# Patient Record
(demographics unavailable — no encounter records)

---

## 2024-10-31 NOTE — HISTORY OF PRESENT ILLNESS
[FreeTextEntry8] : The patient presents today for c/o of cough since last Thursday. Reports fevers for the last week. States she is having coughing attacks that can make her throw up. She saw her father family medicine physician on Monday -- states she had blood work done which she reports was normal and states a rapid flu shot was negative. Denies sick contacts. Associated with sore throat. Reports at home test for covid was negative.   Has been using a cough medication which was prescribed by the physician. Otherwise is a taking Motrin.

## 2024-10-31 NOTE — PLAN
[FreeTextEntry1] : 1. URI - c/o of cough since last Thursday. Reports fevers for the last week. States she is having coughing attacks that can make her throw up. She saw her father family medicine physician on Monday -- states she had blood work done which she reports was normal and states a rapid flu shot was negative. Denies sick contacts. Reports at home test for covid was negative.  - afebrile today - rapid strep negative - trial of benzonatate and albuterol HFA PRN - Augmentin if symptoms persist into this weekend - c/w supportive care, rest, hydration, honey, warm teas  - RTC if no improvement with above

## 2024-10-31 NOTE — PHYSICAL EXAM
[No Acute Distress] : no acute distress [Well-Appearing] : well-appearing [Normal Sclera/Conjunctiva] : normal sclera/conjunctiva [EOMI] : extraocular movements intact [Normal Outer Ear/Nose] : the outer ears and nose were normal in appearance [Normal Oropharynx] : the oropharynx was normal [Normal TMs] : both tympanic membranes were normal [Normal Nasal Mucosa] : the nasal mucosa was normal [No Lymphadenopathy] : no lymphadenopathy [Supple] : supple [No Respiratory Distress] : no respiratory distress  [No Accessory Muscle Use] : no accessory muscle use [Clear to Auscultation] : lungs were clear to auscultation bilaterally [Normal Rate] : normal rate  [Regular Rhythm] : with a regular rhythm [Normal S1, S2] : normal S1 and S2 [No Edema] : there was no peripheral edema [Soft] : abdomen soft [Non Tender] : non-tender [Non-distended] : non-distended [Normal Bowel Sounds] : normal bowel sounds [Normal Posterior Cervical Nodes] : no posterior cervical lymphadenopathy [Normal Anterior Cervical Nodes] : no anterior cervical lymphadenopathy [No CVA Tenderness] : no CVA  tenderness [No Spinal Tenderness] : no spinal tenderness [No Joint Swelling] : no joint swelling [Grossly Normal Strength/Tone] : grossly normal strength/tone [Coordination Grossly Intact] : coordination grossly intact [Normal Gait] : normal gait [Normal Affect] : the affect was normal [Normal Insight/Judgement] : insight and judgment were intact

## 2024-12-31 NOTE — HISTORY OF PRESENT ILLNESS
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 Pt states that the Amitriptyline has been providing significant relief, but she still has occasional constipation. Bloodwork and stool tests performed on 5/13/2024 were unremarkable. Last EGD-Colonoscopy was 6/3/2024. EGD showed gastritis and was negative for H. Pylori. Colonoscopy was unremarkable except for Grade I Internal Hemorrhoids.

## 2024-12-31 NOTE — REASON FOR VISIT
[Home] : at home, [unfilled] , at the time of the visit. [Medical Office: ([facility])___] : at the medical office located in  [Patient] : the patient [Self] : self [Follow-up] : a follow-up of an existing diagnosis [FreeTextEntry1] : IBS-C, Chronic Constipation

## 2024-12-31 NOTE — ASSESSMENT
[FreeTextEntry1] : Instructed pt to stop taking Amitriptyline 10 mg. Amitriptyline 25 mg prescribed, instructed pt to take once daily at night. Instructed pt to continue taking Omeprazole. Instructed pt to f/u by phone in 10 weeks. Pt expressed understanding and agrees with the plan.  A low acid/reflux diet was discussed in great detail including not smoking, not drinking alcohol, and not consuming foods that irritate the esophagus. It is helpful to eat small meals throughout the day instead of large meals. You should avoid eating before bedtime or lying down after you eat. It can be helpful to raise the head of your bed six inches. Additionally, you should maintain a healthy weight and good posture. The patient was given written material to take home and review.  The symptoms of IBS-C include abdominal pain and discomfort, along with changes in bowel function. Bloating and/or gas also may happen. Changes in bowel function may include straining, infrequent stools, hard or lumpy tools, and/or a feeling that the bowel does not empty completely. Some people may feel as if there is a "blockage" preventing them from passing stools. They may need to press on a part of their body or change body position to help them complete their bowel movement. How often a person passes stool, or the way it appears, may be different when abdominal discomfort is happening. With IBS-C, abdominal discomfort often improves after a bowel movement. In most cases, symptoms are ongoing (chronic), but they may come and go. The cause of IBS-C is not known. Some experts think that it relates to changes in how the intestines move and contract, or changes in how the gut senses pain. In some patients, IBS-C may happen after a past infection in the gut. It could also be related to changes in the messages between the brain and the intestines. There is evidence that bacteria which are normally found in the gut, or changes to the composition of those bacteria, play a role. In addition, researchers are looking into possible roles of genetics and/or changes in the immune system. We discussed this at length.  The causes of constipation were discussed at length. We discussed: Eat three meals each day. Do not skip meals. Gradually increase the amount of FIBER in your diet. Choose more whole grain breads, cereals, and rice. Select more raw fruits and vegetables and eat the peel, if appropriate. Read food labels and look for the "dietary fiber" content of foods. Good sources have 2 grams of fiber or more. Drink six to eight glasses of water each day. Limit highly refined and processed foods.  I spent 35 minutes reviewing the patient's records prior to arrival, with the patient, and reviewing records after the visit. All prior testing reviewed at length. Patient verbalized understanding of all information provided. All questions answered and reviewed.  Robert Brunner, MD

## 2025-04-23 NOTE — PHYSICAL EXAM

## 2025-04-23 NOTE — HISTORY OF PRESENT ILLNESS
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 Pt states that the Amitriptyline has been providing significant relief, but for her chronic constipation. Bloodwork and stool tests performed on 5/13/2024 were unremarkable. Pt states that she will soon be going back to school at the "[employer] in [place], [place] and will be returning in 8/2025. Last EGD-Colonoscopy was 6/3/2024. EGD showed gastritis and was negative for H. Pylori. Colonoscopy was unremarkable except for Grade I Internal Hemorrhoids.

## 2025-04-23 NOTE — ASSESSMENT
[FreeTextEntry1] : Instructed pt to take her Omeprazole 40 mg BID as prescribed, refills sent. Instructed pt to continue all other present management. Instructed pt to f/u by phone in 1 month or if she develops any sx before 8/2025. Pt expressed understanding and agrees with the plan.  A low acid/reflux diet was discussed in great detail including not smoking, not drinking alcohol, and not consuming foods that irritate the esophagus. It is helpful to eat small meals throughout the day instead of large meals. You should avoid eating before bedtime or lying down after you eat. It can be helpful to raise the head of your bed six inches. Additionally, you should maintain a healthy weight and good posture. The patient was given written material to take home and review.  The symptoms of IBS-C include abdominal pain and discomfort, along with changes in bowel function. Bloating and/or gas also may happen. Changes in bowel function may include straining, infrequent stools, hard or lumpy tools, and/or a feeling that the bowel does not empty completely. Some people may feel as if there is a "blockage" preventing them from passing stools. They may need to press on a part of their body or change body position to help them complete their bowel movement. How often a person passes stool, or the way it appears, may be different when abdominal discomfort is happening. With IBS-C, abdominal discomfort often improves after a bowel movement. In most cases, symptoms are ongoing (chronic), but they may come and go. The cause of IBS-C is not known. Some experts think that it relates to changes in how the intestines move and contract, or changes in how the gut senses pain. In some patients, IBS-C may happen after a past infection in the gut. It could also be related to changes in the messages between the brain and the intestines. There is evidence that bacteria which are normally found in the gut, or changes to the composition of those bacteria, play a role. In addition, researchers are looking into possible roles of genetics and/or changes in the immune system. We discussed this at length.  The causes of constipation were discussed at length. We discussed: Eat three meals each day. Do not skip meals. Gradually increase the amount of FIBER in your diet. Choose more whole grain breads, cereals, and rice. Select more raw fruits and vegetables and eat the peel, if appropriate. Read food labels and look for the "dietary fiber" content of foods. Good sources have 2 grams of fiber or more. Drink six to eight glasses of water each day. Limit highly refined and processed foods.  I spent 35 minutes reviewing the patient's records prior to arrival, with the patient, and reviewing records after the visit. All prior testing reviewed at length. Patient verbalized understanding of all information provided. All questions answered and reviewed.  Robert Brunner, MD

## 2025-06-23 NOTE — PHYSICAL EXAM
[Alert] : alert [Normal Voice/Communication] : normal voice/communication [Healthy Appearing] : healthy appearing [No Acute Distress] : no acute distress [Sclera] : the sclera and conjunctiva were normal [Hearing Threshold Finger Rub Not [place]] : hearing was normal [Normal Lips/Gums] : the lips and gums were normal [Oropharynx] : the oropharynx was normal [Normal Appearance] : the appearance of the neck was normal [No Neck Mass] : no neck mass was observed [No Respiratory Distress] : no respiratory distress [No Acc Muscle Use] : no accessory muscle use [Respiration, Rhythm And Depth] : normal respiratory rhythm and effort [Auscultation Breath Sounds / Voice Sounds] : lungs were clear to auscultation bilaterally [Heart Rate And Rhythm] : heart rate was normal and rhythm regular [Normal S1, S2] : normal S1 and S2 [Murmurs] : no murmurs [Bowel Sounds] : normal bowel sounds [No Masses] : no abdominal mass palpated [Abdomen Soft] : soft [] : no hepatosplenomegaly [Other: ___] : [unfilled] [Oriented To Time, Place, And Person] : oriented to person, place, and time

## 2025-06-23 NOTE — ASSESSMENT
[FreeTextEntry1] : The patient mentioned feeling BLOATED   A low FODMAP diet was discussed with the patient at length. The patient had multiple questions all of which were answered. I recommended a nutritionist. Also recommended that the patient keep a food diary. We discussed  options such as Vegetables. Fresh fruits. Dairy that is lactose-free, and hard cheeses, or ripened/matured cheeses including... Beef, pork, chicken, fish, eggs. Avoid breadcrumbs, marinades, and sauces/gravies that may be high in FODMAPs. Soy products including tofu, tempeh. Grains.  The Patient mentioned being GASEOUS  We discussed Probiotics and limiting leafy vegetables and other changes The patient mentioned some GERD We discussed a low acid , high protien diet . The patient said their is occasional CONSTIPATION  We discussed the proper use of fiber and water intake   Patient will need IBS panel including blood work and stool cultures instructions provided how to obtain sample and where to return specimen.  Abdominal sonogram The risks benefits alternatives and complications of the procedure/s were explained to the patient at length. The patient was agreeable and we will proceed.  Patient advised if s/s worsen or become severe to seek emergency treatment   Patient verbalized understanding of all information provided. All questions answered and reviewed.  I spent 35 minutes with the patient as well as reviewing documents prior to and after the office visit   Referral for PCP Dr Hernandez provided as patient is requesting a new PCP.

## 2025-06-23 NOTE — REASON FOR VISIT
[Follow-up] : a follow-up of an existing diagnosis [FreeTextEntry1] : nausea, abdominal discomfort, and change in bowel habits

## 2025-06-23 NOTE — REVIEW OF SYSTEMS
[As Noted in HPI] : as noted in HPI [Abdominal Pain] : abdominal pain [Constipation] : constipation [Diarrhea] : diarrhea [Bloating (gassiness)] : bloating [Negative] : Heme/Lymph

## 2025-06-23 NOTE — HISTORY OF PRESENT ILLNESS
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 Pt states that the Amitriptyline has been providing significant relief, but for her chronic constipation. Bloodwork and stool tests performed on 5/13/2024 were unremarkable. Pt states that she will soon be going back to school at the [employer] in [place], [place] and will be returning in 8/2025. Last EGD-Colonoscopy was 6/3/2024. EGD showed gastritis and was negative for H. Pylori. Colonoscopy was unremarkable except for Grade I Internal Hemorrhoids.  6/23/25: Patient states last tuesday she ate pizza at school and noticed after that she began to experience nausea, watery diarrhea and abdominal discomfort. She is no longer experiencing diarrhea but constipation. Any food that she consumes she has noticed it makes her nauseous and she has abdominal discomfort.  Denies rectal bleeding, blood in the stool, melena, or hematemesis.

## 2025-07-14 NOTE — REASON FOR VISIT
[Follow-up] : a follow-up of an existing diagnosis [FreeTextEntry1] : nausea, abdominal discomfort, and change in bowel habits.

## 2025-07-14 NOTE — PHYSICAL EXAM
[Alert] : alert [Normal Voice/Communication] : normal voice/communication [Healthy Appearing] : healthy appearing [No Acute Distress] : no acute distress [Sclera] : the sclera and conjunctiva were normal [Hearing Threshold Finger Rub Not [place]] : hearing was normal [Normal Lips/Gums] : the lips and gums were normal [Oropharynx] : the oropharynx was normal [Normal Appearance] : the appearance of the neck was normal [No Neck Mass] : no neck mass was observed [No Respiratory Distress] : no respiratory distress [No Acc Muscle Use] : no accessory muscle use [Respiration, Rhythm And Depth] : normal respiratory rhythm and effort [Auscultation Breath Sounds / Voice Sounds] : lungs were clear to auscultation bilaterally [Heart Rate And Rhythm] : heart rate was normal and rhythm regular [Normal S1, S2] : normal S1 and S2 [Murmurs] : no murmurs [Bowel Sounds] : normal bowel sounds [Abdomen Tenderness] : non-tender [No Masses] : no abdominal mass palpated [Abdomen Soft] : soft [] : no hepatosplenomegaly [Oriented To Time, Place, And Person] : oriented to person, place, and time

## 2025-07-14 NOTE — ASSESSMENT
[FreeTextEntry1] :  low FODMAP diet was discussed with the patient at length. The patient had multiple questions all of which were answered. I recommended a nutritionist. Also recommended that the patient keep a food diary. We discussed options such as Vegetables. Fresh fruits. Dairy that is lactose-free, and hard cheeses, or ripened/matured cheeses including... Beef, pork, chicken, fish, eggs. Avoid breadcrumbs, marinades, and sauces/gravies that may be high in FODMAPs. Soy products including tofu, tempeh. Grains.  The Patient mentioned being GASEOUS We discussed Probiotics and limiting leafy vegetables and other changes The patient mentioned some GERD We discussed a low acid , high protien diet. The patient said their is occasional CONSTIPATION We discussed the proper use of fiber and water intake    Patient  will continue to take PPI and Amitripyline as prescribed.   She will follow up for OV in 6 months  Patient verbalized understanding of all information provided. All questions answered and reviewed.  I spent 35 minutes with the patient as well as reviewing documents prior to and after the office visit

## 2025-07-14 NOTE — REVIEW OF SYSTEMS
Topical Sulfur Applications Counseling: Topical Sulfur Counseling: Patient counseled that this medication may cause skin irritation or allergic reactions.  In the event of skin irritation, the patient was advised to reduce the amount of the drug applied or use it less frequently.   The patient verbalized understanding of the proper use and possible adverse effects of topical sulfur application.  All of the patient's questions and concerns were addressed. [As Noted in HPI] : as noted in HPI High Dose Vitamin A Pregnancy And Lactation Text: High dose vitamin A therapy is contraindicated during pregnancy and breast feeding. [Negative] : Heme/Lymph Birth Control Pills Counseling: Birth Control Pill Counseling: I discussed with the patient the potential side effects of OCPs including but not limited to increased risk of stroke, heart attack, thrombophlebitis, deep venous thrombosis, hepatic adenomas, breast changes, GI upset, headaches, and depression.  The patient verbalized understanding of the proper use and possible adverse effects of OCPs. All of the patient's questions and concerns were addressed. Include Pregnancy/Lactation Warning?: No Spironolactone Pregnancy And Lactation Text: This medication can cause feminization of the male fetus and should be avoided during pregnancy. The active metabolite is also found in breast milk. Erythromycin Counseling:  I discussed with the patient the risks of erythromycin including but not limited to GI upset, allergic reaction, drug rash, diarrhea, increase in liver enzymes, and yeast infections. Birth Control Pills Pregnancy And Lactation Text: This medication should be avoided if pregnant and for the first 30 days post-partum. Minocycline Counseling: Patient advised regarding possible photosensitivity and discoloration of the teeth, skin, lips, tongue and gums.  Patient instructed to avoid sunlight, if possible.  When exposed to sunlight, patients should wear protective clothing, sunglasses, and sunscreen.  The patient was instructed to call the office immediately if the following severe adverse effects occur:  hearing changes, easy bruising/bleeding, severe headache, or vision changes.  The patient verbalized understanding of the proper use and possible adverse effects of minocycline.  All of the patient's questions and concerns were addressed. Benzoyl Peroxide Counseling: Patient counseled that medicine may cause skin irritation and bleach clothing.  In the event of skin irritation, the patient was advised to reduce the amount of the drug applied or use it less frequently.   The patient verbalized understanding of the proper use and possible adverse effects of benzoyl peroxide.  All of the patient's questions and concerns were addressed. Tetracycline Counseling: Patient counseled regarding possible photosensitivity and increased risk for sunburn.  Patient instructed to avoid sunlight, if possible.  When exposed to sunlight, patients should wear protective clothing, sunglasses, and sunscreen.  The patient was instructed to call the office immediately if the following severe adverse effects occur:  hearing changes, easy bruising/bleeding, severe headache, or vision changes.  The patient verbalized understanding of the proper use and possible adverse effects of tetracycline.  All of the patient's questions and concerns were addressed. Patient understands to avoid pregnancy while on therapy due to potential birth defects. Topical Clindamycin Pregnancy And Lactation Text: This medication is Pregnancy Category B and is considered safe during pregnancy. It is unknown if it is excreted in breast milk. Topical Clindamycin Counseling: Patient counseled that this medication may cause skin irritation or allergic reactions.  In the event of skin irritation, the patient was advised to reduce the amount of the drug applied or use it less frequently.   The patient verbalized understanding of the proper use and possible adverse effects of clindamycin.  All of the patient's questions and concerns were addressed. Tazorac Counseling:  Patient advised that medication is irritating and drying.  Patient may need to apply sparingly and wash off after an hour before eventually leaving it on overnight.  The patient verbalized understanding of the proper use and possible adverse effects of tazorac.  All of the patient's questions and concerns were addressed. Doxycycline Counseling:  Patient counseled regarding possible photosensitivity and increased risk for sunburn.  Patient instructed to avoid sunlight, if possible.  When exposed to sunlight, patients should wear protective clothing, sunglasses, and sunscreen.  The patient was instructed to call the office immediately if the following severe adverse effects occur:  hearing changes, easy bruising/bleeding, severe headache, or vision changes.  The patient verbalized understanding of the proper use and possible adverse effects of doxycycline.  All of the patient's questions and concerns were addressed. Dapsone Pregnancy And Lactation Text: This medication is Pregnancy Category C and is not considered safe during pregnancy or breast feeding. Detail Level: Zone High Dose Vitamin A Counseling: Side effects reviewed, pt to contact office should one occur. Dapsone Counseling: I discussed with the patient the risks of dapsone including but not limited to hemolytic anemia, agranulocytosis, rashes, methemoglobinemia, kidney failure, peripheral neuropathy, headaches, GI upset, and liver toxicity.  Patients who start dapsone require monitoring including baseline LFTs and weekly CBCs for the first month, then every month thereafter.  The patient verbalized understanding of the proper use and possible adverse effects of dapsone.  All of the patient's questions and concerns were addressed. Isotretinoin Pregnancy And Lactation Text: This medication is Pregnancy Category X and is considered extremely dangerous during pregnancy. It is unknown if it is excreted in breast milk. Detail Level: Detailed Bactrim Pregnancy And Lactation Text: This medication is Pregnancy Category D and is known to cause fetal risk.  It is also excreted in breast milk. Minocycline Pregnancy And Lactation Text: This medication is Pregnancy Category D and not consider safe during pregnancy. It is also excreted in breast milk. Benzoyl Peroxide Pregnancy And Lactation Text: This medication is Pregnancy Category C. It is unknown if benzoyl peroxide is excreted in breast milk. Azithromycin Pregnancy And Lactation Text: This medication is considered safe during pregnancy and is also secreted in breast milk. Doxycycline Pregnancy And Lactation Text: This medication is Pregnancy Category D and not consider safe during pregnancy. It is also excreted in breast milk but is considered safe for shorter treatment courses. Topical Sulfur Applications Pregnancy And Lactation Text: This medication is Pregnancy Category C and has an unknown safety profile during pregnancy. It is unknown if this topical medication is excreted in breast milk. Isotretinoin Counseling: Patient should get monthly blood tests, not donate blood, not drive at night if vision affected, not share medication, and not undergo elective surgery for 6 months after tx completed. Side effects reviewed, pt to contact office should one occur. Tazorac Pregnancy And Lactation Text: This medication is not safe during pregnancy. It is unknown if this medication is excreted in breast milk. Erythromycin Pregnancy And Lactation Text: This medication is Pregnancy Category B and is considered safe during pregnancy. It is also excreted in breast milk. Sarecycline Counseling: Patient advised regarding possible photosensitivity and discoloration of the teeth, skin, lips, tongue and gums.  Patient instructed to avoid sunlight, if possible.  When exposed to sunlight, patients should wear protective clothing, sunglasses, and sunscreen.  The patient was instructed to call the office immediately if the following severe adverse effects occur:  hearing changes, easy bruising/bleeding, severe headache, or vision changes.  The patient verbalized understanding of the proper use and possible adverse effects of sarecycline.  All of the patient's questions and concerns were addressed. Topical Retinoid Pregnancy And Lactation Text: This medication is Pregnancy Category C. It is unknown if this medication is excreted in breast milk. Bactrim Counseling:  I discussed with the patient the risks of sulfa antibiotics including but not limited to GI upset, allergic reaction, drug rash, diarrhea, dizziness, photosensitivity, and yeast infections.  Rarely, more serious reactions can occur including but not limited to aplastic anemia, agranulocytosis, methemoglobinemia, blood dyscrasias, liver or kidney failure, lung infiltrates or desquamative/blistering drug rashes. Topical Retinoid counseling:  Patient advised to apply a pea-sized amount only at bedtime and wait 30 minutes after washing their face before applying.  If too drying, patient may add a non-comedogenic moisturizer. The patient verbalized understanding of the proper use and possible adverse effects of retinoids.  All of the patient's questions and concerns were addressed. Spironolactone Counseling: Patient advised regarding risks of diarrhea, abdominal pain, hyperkalemia, birth defects (for female patients), liver toxicity and renal toxicity. The patient may need blood work to monitor liver and kidney function and potassium levels while on therapy. The patient verbalized understanding of the proper use and possible adverse effects of spironolactone.  All of the patient's questions and concerns were addressed. Azithromycin Counseling:  I discussed with the patient the risks of azithromycin including but not limited to GI upset, allergic reaction, drug rash, diarrhea, and yeast infections.

## 2025-07-14 NOTE — HISTORY OF PRESENT ILLNESS
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 Pt states that the Amitriptyline has been providing significant relief, but for her chronic constipation. Bloodwork and stool tests performed on 5/13/2024 were unremarkable. Pt states that she will soon be going back to school at the [employer] in [place], [place] and will be returning in 8/2025. Last EGD-Colonoscopy was 6/3/2024. EGD showed gastritis and was negative for H. Pylori. Colonoscopy was unremarkable except for Grade I Internal Hemorrhoids.  6/23/25: Patient states last tuesday she ate pizza at school and noticed after that she began to experience nausea, watery diarrhea and abdominal discomfort. She is no longer experiencing diarrhea but constipation. Any food that she consumes she has noticed it makes her nauseous and she has abdominal discomfort. Denies rectal bleeding, blood in the stool, melena, or hematemesis.  7/14/25: Patient is feeling much better since Ct Scan of the Abdomen preformed 6/10/25 revealed CT of the Abdomen and Pelvis was performed. Sagittal and coronal reformats were performed.  FINDINGS: LOWER CHEST: Within normal limits.  LIVER: Within normal limits. BILE DUCTS: Normal caliber. GALLBLADDER: Within normal limits. SPLEEN: Within normal limits. PANCREAS: Within normal limits. ADRENALS: Within normal limits. KIDNEYS/URETERS: Within normal limits.  BLADDER: Within normal limits. REPRODUCTIVE ORGANS: Uterus and adnexa within normal limits.  BOWEL: No bowel obstruction. Appendix is normal. PERITONEUM/RETROPERITONEUM: Trace pelvic fluid, likely physiologic. VESSELS: Within normal limits. LYMPH NODES: No lymphadenopathy. ABDOMINAL WALL: Within normal limits. BONES: Within normal limits.  IMPRESSION:  No CT explanation for abdominal pain.   Patient is taking Amitriptyline and Omeprazole as prescribed. She feels well overall with taking these medications. Bowel movements have been restored to normal.